# Patient Record
Sex: MALE | Race: WHITE | NOT HISPANIC OR LATINO | Employment: FULL TIME | URBAN - METROPOLITAN AREA
[De-identification: names, ages, dates, MRNs, and addresses within clinical notes are randomized per-mention and may not be internally consistent; named-entity substitution may affect disease eponyms.]

---

## 2022-02-07 NOTE — PROGRESS NOTES
Subjective:      Chief Complaint: Establishing     HPI   Mr. Colin Mixon is a 36 man presenting as a new patient to establish primary care.  Does have a rolled orthopedic history (bilateral ankle surgery, chronic relapsing/remitting low back pain, etc.), but none are currently in exacerbation.    Family, social, surgical Hx reviewed     Health Maintenance:  Prostate CA screening: no indication for early initiation.   Colorectal CA screening: no indication for early initiation.   Cholesterol: Due for baseline  Vaccines:  For annual flu (prioritized) and COVID booster (will have done through [employer])  STD screening:  Deferred  Diet and Exercise: Discussed current recommendations     Past Medical History:   Diagnosis Date    Basal cell carcinoma     History of ankle surgery      No past surgical history on file.  Family History   Problem Relation Age of Onset    Skin cancer Mother      Social History     Socioeconomic History    Marital status:    Occupational History    Occupation: [employer]    Tobacco Use    Smoking status: Never Smoker    Smokeless tobacco: Never Used   Substance and Sexual Activity    Alcohol use: Yes     Comment: Occ     Drug use: Never    Sexual activity: Yes     Partners: Female     Review of patient's allergies indicates:  No Known Allergies  Kaden Mixon had no medications administered during this visit.    Review of Systems   Constitutional: Negative for appetite change, chills and fever.   HENT: Negative.    Respiratory: Negative for cough, chest tightness and shortness of breath.    Cardiovascular: Negative for chest pain, palpitations and leg swelling.   Gastrointestinal: Negative for abdominal distention, abdominal pain, blood in stool, constipation, diarrhea, nausea and vomiting.   Endocrine: Negative.    Genitourinary: Negative for difficulty urinating, dysuria, frequency and hematuria.   Musculoskeletal: Negative.    Integumentary:  Negative.    Neurological: Negative.    Psychiatric/Behavioral: Negative.          Objective:      Vitals:    02/08/22 0742   BP: 118/82   Pulse: 78   Resp: 20   Temp: 97.9 °F (36.6 °C)      Physical Exam  Vitals reviewed.   Constitutional:       General: He is not in acute distress.     Appearance: Normal appearance.   HENT:      Head: Normocephalic and atraumatic.      Comments: Facial features are symmetric      Nose: Nose normal. No congestion or rhinorrhea.      Mouth/Throat:      Mouth: Mucous membranes are moist.      Pharynx: Oropharynx is clear. No oropharyngeal exudate or posterior oropharyngeal erythema.   Eyes:      General: No scleral icterus.     Extraocular Movements: Extraocular movements intact.      Conjunctiva/sclera: Conjunctivae normal.   Cardiovascular:      Rate and Rhythm: Normal rate and regular rhythm.      Pulses: Normal pulses.      Heart sounds: Normal heart sounds.   Pulmonary:      Effort: Pulmonary effort is normal. No respiratory distress.      Breath sounds: Normal breath sounds.   Musculoskeletal:         General: No deformity or signs of injury. Normal range of motion.      Cervical back: Normal range of motion.      Comments: Gait normal    Skin:     General: Skin is warm and dry.      Findings: No rash.   Neurological:      General: No focal deficit present.      Mental Status: He is alert and oriented to person, place, and time. Mental status is at baseline.   Psychiatric:         Mood and Affect: Mood normal.         Behavior: Behavior normal.         Thought Content: Thought content normal.       No current outpatient medications on file prior to visit.     No current facility-administered medications on file prior to visit.         Assessment:       1. Establishing care with new doctor, encounter for    2. Physical exam, annual        Plan:       Establishing care with new doctor, encounter for  Physical exam, annual  -     CBC Auto Differential; Future; Expected date: 02/08/2022  -      Comprehensive Metabolic Panel; Future; Expected date: 02/08/2022  -     Hemoglobin A1C; Future; Expected date: 02/08/2022  -     Hepatitis C Antibody; Future; Expected date: 02/08/2022  -     HIV 1/2 Ag/Ab (4th Gen); Future; Expected date: 02/08/2022  -     Lipid Panel; Future; Expected date: 02/08/2022  -     T4; Future; Expected date: 02/08/2022  -     TSH; Future; Expected date: 02/08/2022   - baseline/annual screening labs ordered   - vaccinations brought up-to-date with in office flu shot; patient have COVID booster administered via [employer]; health maintenance otherwise up-to-date

## 2022-02-08 ENCOUNTER — LAB VISIT (OUTPATIENT)
Dept: LAB | Facility: HOSPITAL | Age: 37
End: 2022-02-08
Attending: STUDENT IN AN ORGANIZED HEALTH CARE EDUCATION/TRAINING PROGRAM
Payer: COMMERCIAL

## 2022-02-08 ENCOUNTER — OFFICE VISIT (OUTPATIENT)
Dept: INTERNAL MEDICINE | Facility: CLINIC | Age: 37
End: 2022-02-08
Payer: COMMERCIAL

## 2022-02-08 VITALS
TEMPERATURE: 98 F | RESPIRATION RATE: 20 BRPM | WEIGHT: 189.38 LBS | SYSTOLIC BLOOD PRESSURE: 118 MMHG | DIASTOLIC BLOOD PRESSURE: 82 MMHG | OXYGEN SATURATION: 98 % | HEART RATE: 78 BPM | HEIGHT: 68 IN | BODY MASS INDEX: 28.7 KG/M2

## 2022-02-08 DIAGNOSIS — Z76.89 ESTABLISHING CARE WITH NEW DOCTOR, ENCOUNTER FOR: Primary | ICD-10-CM

## 2022-02-08 DIAGNOSIS — Z00.00 PHYSICAL EXAM, ANNUAL: ICD-10-CM

## 2022-02-08 LAB
ALBUMIN SERPL BCP-MCNC: 4.5 G/DL (ref 3.5–5.2)
ALP SERPL-CCNC: 40 U/L (ref 55–135)
ALT SERPL W/O P-5'-P-CCNC: 16 U/L (ref 10–44)
ANION GAP SERPL CALC-SCNC: 9 MMOL/L (ref 8–16)
AST SERPL-CCNC: 21 U/L (ref 10–40)
BASOPHILS # BLD AUTO: 0.04 K/UL (ref 0–0.2)
BASOPHILS NFR BLD: 1 % (ref 0–1.9)
BILIRUB SERPL-MCNC: 0.9 MG/DL (ref 0.1–1)
BUN SERPL-MCNC: 13 MG/DL (ref 6–20)
CALCIUM SERPL-MCNC: 10.2 MG/DL (ref 8.7–10.5)
CHLORIDE SERPL-SCNC: 103 MMOL/L (ref 95–110)
CHOLEST SERPL-MCNC: 233 MG/DL (ref 120–199)
CHOLEST/HDLC SERPL: 4 {RATIO} (ref 2–5)
CO2 SERPL-SCNC: 23 MMOL/L (ref 23–29)
CREAT SERPL-MCNC: 0.8 MG/DL (ref 0.5–1.4)
DIFFERENTIAL METHOD: NORMAL
EOSINOPHIL # BLD AUTO: 0.1 K/UL (ref 0–0.5)
EOSINOPHIL NFR BLD: 3.2 % (ref 0–8)
ERYTHROCYTE [DISTWIDTH] IN BLOOD BY AUTOMATED COUNT: 12.2 % (ref 11.5–14.5)
EST. GFR  (AFRICAN AMERICAN): >60 ML/MIN/1.73 M^2
EST. GFR  (NON AFRICAN AMERICAN): >60 ML/MIN/1.73 M^2
ESTIMATED AVG GLUCOSE: 100 MG/DL (ref 68–131)
GLUCOSE SERPL-MCNC: 93 MG/DL (ref 70–110)
HBA1C MFR BLD: 5.1 % (ref 4–5.6)
HCT VFR BLD AUTO: 42.9 % (ref 40–54)
HCV AB SERPL QL IA: NEGATIVE
HDLC SERPL-MCNC: 58 MG/DL (ref 40–75)
HDLC SERPL: 24.9 % (ref 20–50)
HGB BLD-MCNC: 14.3 G/DL (ref 14–18)
HIV 1+2 AB+HIV1 P24 AG SERPL QL IA: NEGATIVE
IMM GRANULOCYTES # BLD AUTO: 0.01 K/UL (ref 0–0.04)
IMM GRANULOCYTES NFR BLD AUTO: 0.2 % (ref 0–0.5)
LDLC SERPL CALC-MCNC: 153.6 MG/DL (ref 63–159)
LYMPHOCYTES # BLD AUTO: 1.7 K/UL (ref 1–4.8)
LYMPHOCYTES NFR BLD: 41.4 % (ref 18–48)
MCH RBC QN AUTO: 30 PG (ref 27–31)
MCHC RBC AUTO-ENTMCNC: 33.3 G/DL (ref 32–36)
MCV RBC AUTO: 90 FL (ref 82–98)
MONOCYTES # BLD AUTO: 0.4 K/UL (ref 0.3–1)
MONOCYTES NFR BLD: 9.7 % (ref 4–15)
NEUTROPHILS # BLD AUTO: 1.8 K/UL (ref 1.8–7.7)
NEUTROPHILS NFR BLD: 44.5 % (ref 38–73)
NONHDLC SERPL-MCNC: 175 MG/DL
NRBC BLD-RTO: 0 /100 WBC
PLATELET # BLD AUTO: 270 K/UL (ref 150–450)
PMV BLD AUTO: 11.8 FL (ref 9.2–12.9)
POTASSIUM SERPL-SCNC: 4.1 MMOL/L (ref 3.5–5.1)
PROT SERPL-MCNC: 7.6 G/DL (ref 6–8.4)
RBC # BLD AUTO: 4.76 M/UL (ref 4.6–6.2)
SODIUM SERPL-SCNC: 135 MMOL/L (ref 136–145)
T4 SERPL-MCNC: 8.2 UG/DL (ref 4.5–11.5)
TRIGL SERPL-MCNC: 107 MG/DL (ref 30–150)
TSH SERPL DL<=0.005 MIU/L-ACNC: 2.17 UIU/ML (ref 0.4–4)
WBC # BLD AUTO: 4.03 K/UL (ref 3.9–12.7)

## 2022-02-08 PROCEDURE — 80061 LIPID PANEL: CPT | Performed by: STUDENT IN AN ORGANIZED HEALTH CARE EDUCATION/TRAINING PROGRAM

## 2022-02-08 PROCEDURE — 84443 ASSAY THYROID STIM HORMONE: CPT | Performed by: STUDENT IN AN ORGANIZED HEALTH CARE EDUCATION/TRAINING PROGRAM

## 2022-02-08 PROCEDURE — 3079F DIAST BP 80-89 MM HG: CPT | Mod: CPTII,S$GLB,, | Performed by: STUDENT IN AN ORGANIZED HEALTH CARE EDUCATION/TRAINING PROGRAM

## 2022-02-08 PROCEDURE — 99999 PR PBB SHADOW E&M-NEW PATIENT-LVL III: ICD-10-PCS | Mod: PBBFAC,,, | Performed by: STUDENT IN AN ORGANIZED HEALTH CARE EDUCATION/TRAINING PROGRAM

## 2022-02-08 PROCEDURE — 90471 IMMUNIZATION ADMIN: CPT | Mod: S$GLB,,, | Performed by: STUDENT IN AN ORGANIZED HEALTH CARE EDUCATION/TRAINING PROGRAM

## 2022-02-08 PROCEDURE — 3079F PR MOST RECENT DIASTOLIC BLOOD PRESSURE 80-89 MM HG: ICD-10-PCS | Mod: CPTII,S$GLB,, | Performed by: STUDENT IN AN ORGANIZED HEALTH CARE EDUCATION/TRAINING PROGRAM

## 2022-02-08 PROCEDURE — 83036 HEMOGLOBIN GLYCOSYLATED A1C: CPT | Performed by: STUDENT IN AN ORGANIZED HEALTH CARE EDUCATION/TRAINING PROGRAM

## 2022-02-08 PROCEDURE — 85025 COMPLETE CBC W/AUTO DIFF WBC: CPT | Performed by: STUDENT IN AN ORGANIZED HEALTH CARE EDUCATION/TRAINING PROGRAM

## 2022-02-08 PROCEDURE — 99385 PREV VISIT NEW AGE 18-39: CPT | Mod: 25,S$GLB,, | Performed by: STUDENT IN AN ORGANIZED HEALTH CARE EDUCATION/TRAINING PROGRAM

## 2022-02-08 PROCEDURE — 3008F PR BODY MASS INDEX (BMI) DOCUMENTED: ICD-10-PCS | Mod: CPTII,S$GLB,, | Performed by: STUDENT IN AN ORGANIZED HEALTH CARE EDUCATION/TRAINING PROGRAM

## 2022-02-08 PROCEDURE — 3074F PR MOST RECENT SYSTOLIC BLOOD PRESSURE < 130 MM HG: ICD-10-PCS | Mod: CPTII,S$GLB,, | Performed by: STUDENT IN AN ORGANIZED HEALTH CARE EDUCATION/TRAINING PROGRAM

## 2022-02-08 PROCEDURE — 3074F SYST BP LT 130 MM HG: CPT | Mod: CPTII,S$GLB,, | Performed by: STUDENT IN AN ORGANIZED HEALTH CARE EDUCATION/TRAINING PROGRAM

## 2022-02-08 PROCEDURE — 90471 FLU VACCINE (QUAD) GREATER THAN OR EQUAL TO 3YO PRESERVATIVE FREE IM: ICD-10-PCS | Mod: S$GLB,,, | Performed by: STUDENT IN AN ORGANIZED HEALTH CARE EDUCATION/TRAINING PROGRAM

## 2022-02-08 PROCEDURE — 80053 COMPREHEN METABOLIC PANEL: CPT | Performed by: STUDENT IN AN ORGANIZED HEALTH CARE EDUCATION/TRAINING PROGRAM

## 2022-02-08 PROCEDURE — 1159F MED LIST DOCD IN RCRD: CPT | Mod: CPTII,S$GLB,, | Performed by: STUDENT IN AN ORGANIZED HEALTH CARE EDUCATION/TRAINING PROGRAM

## 2022-02-08 PROCEDURE — 87389 HIV-1 AG W/HIV-1&-2 AB AG IA: CPT | Performed by: STUDENT IN AN ORGANIZED HEALTH CARE EDUCATION/TRAINING PROGRAM

## 2022-02-08 PROCEDURE — 90686 IIV4 VACC NO PRSV 0.5 ML IM: CPT | Mod: S$GLB,,, | Performed by: STUDENT IN AN ORGANIZED HEALTH CARE EDUCATION/TRAINING PROGRAM

## 2022-02-08 PROCEDURE — 3008F BODY MASS INDEX DOCD: CPT | Mod: CPTII,S$GLB,, | Performed by: STUDENT IN AN ORGANIZED HEALTH CARE EDUCATION/TRAINING PROGRAM

## 2022-02-08 PROCEDURE — 99385 PR PREVENTIVE VISIT,NEW,18-39: ICD-10-PCS | Mod: 25,S$GLB,, | Performed by: STUDENT IN AN ORGANIZED HEALTH CARE EDUCATION/TRAINING PROGRAM

## 2022-02-08 PROCEDURE — 86803 HEPATITIS C AB TEST: CPT | Performed by: STUDENT IN AN ORGANIZED HEALTH CARE EDUCATION/TRAINING PROGRAM

## 2022-02-08 PROCEDURE — 84436 ASSAY OF TOTAL THYROXINE: CPT | Performed by: STUDENT IN AN ORGANIZED HEALTH CARE EDUCATION/TRAINING PROGRAM

## 2022-02-08 PROCEDURE — 90686 FLU VACCINE (QUAD) GREATER THAN OR EQUAL TO 3YO PRESERVATIVE FREE IM: ICD-10-PCS | Mod: S$GLB,,, | Performed by: STUDENT IN AN ORGANIZED HEALTH CARE EDUCATION/TRAINING PROGRAM

## 2022-02-08 PROCEDURE — 1159F PR MEDICATION LIST DOCUMENTED IN MEDICAL RECORD: ICD-10-PCS | Mod: CPTII,S$GLB,, | Performed by: STUDENT IN AN ORGANIZED HEALTH CARE EDUCATION/TRAINING PROGRAM

## 2022-02-08 PROCEDURE — 36415 COLL VENOUS BLD VENIPUNCTURE: CPT | Mod: PO | Performed by: STUDENT IN AN ORGANIZED HEALTH CARE EDUCATION/TRAINING PROGRAM

## 2022-02-08 PROCEDURE — 99999 PR PBB SHADOW E&M-NEW PATIENT-LVL III: CPT | Mod: PBBFAC,,, | Performed by: STUDENT IN AN ORGANIZED HEALTH CARE EDUCATION/TRAINING PROGRAM

## 2023-03-06 ENCOUNTER — PATIENT OUTREACH (OUTPATIENT)
Dept: ADMINISTRATIVE | Facility: HOSPITAL | Age: 38
End: 2023-03-06
Payer: COMMERCIAL

## 2023-10-16 ENCOUNTER — TELEPHONE (OUTPATIENT)
Dept: DERMATOLOGY | Facility: CLINIC | Age: 38
End: 2023-10-16
Payer: COMMERCIAL

## 2023-10-16 ENCOUNTER — PATIENT MESSAGE (OUTPATIENT)
Dept: DERMATOLOGY | Facility: CLINIC | Age: 38
End: 2023-10-16
Payer: COMMERCIAL

## 2023-10-17 ENCOUNTER — TELEPHONE (OUTPATIENT)
Dept: DERMATOLOGY | Facility: CLINIC | Age: 38
End: 2023-10-17
Payer: COMMERCIAL

## 2023-10-18 ENCOUNTER — TELEPHONE (OUTPATIENT)
Dept: DERMATOLOGY | Facility: CLINIC | Age: 38
End: 2023-10-18
Payer: COMMERCIAL

## 2023-10-18 ENCOUNTER — PATIENT MESSAGE (OUTPATIENT)
Dept: DERMATOLOGY | Facility: CLINIC | Age: 38
End: 2023-10-18
Payer: COMMERCIAL

## 2023-10-18 NOTE — TELEPHONE ENCOUNTER
Spoke w/pt, same day Mohs for BCC L nose scheduled 11/29/23 @ 0730/ Phone screening completed, pt verb understanding of procedure, post op information, date/time and location of procedure. Mohs packet mailed and sent via portal.                 ----- Message from Indra Dumont sent at 10/18/2023  9:11 AM CDT -----  Regarding: Mohs for BCC L nose appt  Contact: [phone]  Patient is calling to schedule appointment due to Mohs for BCC L nose.  Please contact patient to further discuss.

## 2023-11-16 ENCOUNTER — TELEPHONE (OUTPATIENT)
Dept: SLEEP MEDICINE | Facility: CLINIC | Age: 38
End: 2023-11-16
Payer: COMMERCIAL

## 2023-11-16 NOTE — TELEPHONE ENCOUNTER
[facility]ons[facility] Clinical Support Staff  Caller: [phone] (Today, 10:09 AM)  Patient wife is calling to speak with in clinic for an appointment she has some question she would like to ask before scheduling. Please contact pt wife    Patient was contacted and schedule for a self referral, has currently [employer] insurance, patient wife stated patient is snoring, no gasping for air, trouble falling and staying asleep.

## 2023-11-29 ENCOUNTER — PROCEDURE VISIT (OUTPATIENT)
Dept: DERMATOLOGY | Facility: CLINIC | Age: 38
End: 2023-11-29
Payer: COMMERCIAL

## 2023-11-29 VITALS
WEIGHT: 189 LBS | HEIGHT: 68 IN | SYSTOLIC BLOOD PRESSURE: 141 MMHG | DIASTOLIC BLOOD PRESSURE: 94 MMHG | HEART RATE: 69 BPM | BODY MASS INDEX: 28.64 KG/M2

## 2023-11-29 DIAGNOSIS — C44.311 BASAL CELL CARCINOMA OF LEFT SIDE OF NOSE: Primary | ICD-10-CM

## 2023-11-29 PROCEDURE — 99499 UNLISTED E&M SERVICE: CPT | Mod: S$GLB,,, | Performed by: DERMATOLOGY

## 2023-11-29 PROCEDURE — 17312: ICD-10-PCS | Mod: S$GLB,,, | Performed by: DERMATOLOGY

## 2023-11-29 PROCEDURE — 17312 MOHS ADDL STAGE: CPT | Mod: S$GLB,,, | Performed by: DERMATOLOGY

## 2023-11-29 PROCEDURE — 17311 MOHS 1 STAGE H/N/HF/G: CPT | Mod: S$GLB,,, | Performed by: DERMATOLOGY

## 2023-11-29 PROCEDURE — 13151 PR RECMPL WND LID,NOS,EAR 1.1-2.5 CM: ICD-10-PCS | Mod: 51,S$GLB,, | Performed by: DERMATOLOGY

## 2023-11-29 PROCEDURE — 99499 NO LOS: ICD-10-PCS | Mod: S$GLB,,, | Performed by: DERMATOLOGY

## 2023-11-29 PROCEDURE — 17311: ICD-10-PCS | Mod: S$GLB,,, | Performed by: DERMATOLOGY

## 2023-11-29 PROCEDURE — 13151 CMPLX RPR E/N/E/L 1.1-2.5 CM: CPT | Mod: 51,S$GLB,, | Performed by: DERMATOLOGY

## 2023-11-29 RX ORDER — HYDROCODONE BITARTRATE AND ACETAMINOPHEN 5; 325 MG/1; MG/1
1 TABLET ORAL EVERY 6 HOURS PRN
Qty: 10 TABLET | Refills: 0 | Status: SHIPPED | OUTPATIENT
Start: 2023-11-29 | End: 2024-02-27

## 2023-11-29 RX ORDER — CEPHALEXIN 500 MG/1
500 CAPSULE ORAL 3 TIMES DAILY
Qty: 21 CAPSULE | Refills: 0 | Status: SHIPPED | OUTPATIENT
Start: 2023-11-29 | End: 2023-12-06

## 2023-11-29 NOTE — PROGRESS NOTES
New patient with a 1 month h/o growth on the L nose. (+) scabbing. Denies bleeding, itching. Biopsy c/w BCC. No prior treatment. (+) h/o BCC R nose.    Physical Exam   HENT:   Nose:           L mid nasal sidewall with a 3 x 3 mm pink bx site located 3 cm inferiorly from the left medial canthus and 3.5 cm superiorly from the left alar base.    Biopsy proven nodular basal cell carcinoma- L nose, path# [id number].  Diagnosis, photograph, and pathology report were reviewed with patient.  Discussed risks, benefits, and alternatives of Mohs surgery.  Discussed repair options including complex closure, skin flap, skin graft, and second intention healing.  Patient agreed to proceed with Mohs surgery today.      PROCEDURE: Mohs' Micrographic Surgery    INDICATION: Location in mask areas of face including central face, nose, eyelids, eyebrows, lips, chin, preauricular, temple, and ear. Biopsy-proven skin cancer of cosmetically and functionally important areas, including head, neck, genital, hand, foot, or areas known for having difficulty in healing, such as the lower anterior legs. Tumor with ill-defined borders. In the very young patient, < 40 years of age.    REFERRING PROVIDER: Jessica Coller [facility], M.D.    CASE NUMBER:     ANESTHETIC: 2.5 cc 0.5% Lidocaine with Epi 1:200,000 mixed 1:1 with 0.5% Bupivacaine    SURGICAL PREP: Hibiclens    SURGEON: Brody Luis MD    ASSISTANTS: Fanny Angelo PA-C, Shwetha Newman MA, and Ashanti Crawford, Surg Tech    PREOPERATIVE DIAGNOSIS: basal cell carcinoma- nodular    POSTOPERATIVE DIAGNOSIS: basal cell carcinoma- nodular, superficial    PATHOLOGIC DIAGNOSIS: basal cell carcinoma- nodular, superficial    HISTOLOGY OF SPECIMENS IN FIRST STAGE:   Debulking tumor confirms nodular basal cell carcinoma.  Tumor Type: Tumor seen. Superficial basal cell carcinoma: Foci of basaloid cells with peripheral palisading and focal retraction artifact arising along the dermoepidermal junction and  extending into the papillary dermis.  Nodular basal cell carcinoma: Nodular tumor in dermis composed of basaloid cells exhibiting peripheral palisading and retraction artifact.   Depth of Invasion: epidermis and dermis  Perineural Invasion: No    HISTOLOGY OF SPECIMENS IN SUBSEQUENT STAGES:  Tumor Type:  No tumor seen.    STAGES OF MOHS' SURGERY PERFORMED: 2    TUMOR-FREE PLANE ACHIEVED: Yes    HEMOSTASIS: electrocoagulation     SPECIMENS: 4 (2 in stage A and 2 in stage B)    LOCATION: left nose (mid sidewall). Location verified with patient's photograph. Patient also verified location with hand held mirror.     INITIAL LESION SIZE: 0.3 x 0.3 cm    FINAL DEFECT SIZE: 0.8 x 1.0 cm    WOUND REPAIR/DISPOSITION: The patient tolerated Mohs' Micrographic Surgery for a basal cell carcinoma very well. When the tumor was completely removed, a repair of the surgical defect was undertaken.    PROCEDURE: Complex Linear Repair    INDICATION: Status post Mohs' Micrographic Surgery for basal cell carcinoma.    CASE NUMBER:     SURGEON: Brody Luis MD    ASSISTANTS: Fanny Angelo PA-C and Ashanti Crawford Surg Marita    ANESTHETIC: 0.5 cc 1% Lidocaine with Epinephrine 1:100,000    SURGICAL PREP: Hibiclens, prepped by Ashanti Crawford Surg Marita    LOCATION: left nose (mid sidewall)    DEFECT SIZE: 0.8 x 1.0 cm    WOUND REPAIR/DISPOSITION:  After the patient's carcinoma had been completely removed with Mohs' Micrographic Surgery, a repair of the surgical defect was undertaken. The patient was returned to the operating suite where the area of left mid sidewall was prepped, draped, and anesthetized in the usual sterile fashion. The wound was widely undermined in all directions. The wound was undermined to a distance at least the maximum width of the defect as measured perpendicular to the closure line along at least one entire edge of the defect, in this case 1.5 cm. Then, electrocoagulation was used to obtain meticulous hemostasis. 5-0  "Vicryl buried vertical mattress sutures were placed into the subcutaneous and dermal plane to close the wound and antonina the cutaneous wound edge. Bilateral dog ears were identified and were removed by a standard Burow's triangle technique. The cutaneous wound edges were closed using interrupted 5-0 Prolene suture.    The patient tolerated the procedure well.    The area was cleaned and dressed appropriately and the patient was given wound care instructions, as well as appointment for follow-up evaluation and suture removal in 7 days. Patient was placed on Norco 5-325 mg prn postop pain and Keflex 500 mg TID x 7 days.    LENGTH OF REPAIR: 2.3 cm    Vitals:    11/29/23 0659 11/29/23 1004   BP: 132/89 (!) 141/94   BP Location: Left arm Right arm   Patient Position: Sitting Sitting   BP Method: Large (Automatic) Medium (Automatic)   Pulse: 83 69   Weight: 85.7 kg (189 lb)    Height: 5' 8" (1.727 m)          "

## 2023-12-06 ENCOUNTER — OFFICE VISIT (OUTPATIENT)
Dept: DERMATOLOGY | Facility: CLINIC | Age: 38
End: 2023-12-06
Payer: COMMERCIAL

## 2023-12-06 DIAGNOSIS — Z09 POSTOP CHECK: Primary | ICD-10-CM

## 2023-12-06 PROCEDURE — 99999 PR PBB SHADOW E&M-EST. PATIENT-LVL I: CPT | Mod: PBBFAC,,, | Performed by: DERMATOLOGY

## 2023-12-06 PROCEDURE — 99999 PR PBB SHADOW E&M-EST. PATIENT-LVL I: ICD-10-PCS | Mod: PBBFAC,,, | Performed by: DERMATOLOGY

## 2023-12-06 PROCEDURE — 99024 POSTOP FOLLOW-UP VISIT: CPT | Mod: S$GLB,,, | Performed by: DERMATOLOGY

## 2023-12-06 PROCEDURE — 99024 PR POST-OP FOLLOW-UP VISIT: ICD-10-PCS | Mod: S$GLB,,, | Performed by: DERMATOLOGY

## 2023-12-06 NOTE — PROGRESS NOTES
38 y.o. male patient is here for suture removal following Mohs' surgery.    Patient reports no problems.    WOUND PE:  The L nose sutures intact. Wound healing well. Good skin edges. No signs or symptoms of infection.    IMPRESSION:  Healing operative site from Mohs' surgery, BCC L nose s/p Mohs with CLC, postop day #7.    PLAN:  Sutures removed today by  Miracle Moser RN . Steri-strips applied.  Continue wound care.  Keep moist with Aquaphor.  May start massaging in 1 month  Offered 1 month recheck - pt to call if needed.     RTC:  In 3-6 months with Jessica Coller [facility], M.D. for skin check or sooner if new concern arises.

## 2024-02-26 ENCOUNTER — PATIENT MESSAGE (OUTPATIENT)
Dept: SLEEP MEDICINE | Facility: CLINIC | Age: 39
End: 2024-02-26
Payer: COMMERCIAL

## 2024-02-27 ENCOUNTER — OFFICE VISIT (OUTPATIENT)
Dept: SLEEP MEDICINE | Facility: CLINIC | Age: 39
End: 2024-02-27
Payer: COMMERCIAL

## 2024-02-27 VITALS — BODY MASS INDEX: 28.04 KG/M2 | HEIGHT: 68 IN | WEIGHT: 185 LBS

## 2024-02-27 DIAGNOSIS — G47.30 SLEEP APNEA, UNSPECIFIED TYPE: Primary | ICD-10-CM

## 2024-02-27 PROCEDURE — 3008F BODY MASS INDEX DOCD: CPT | Mod: CPTII,S$GLB,, | Performed by: NURSE PRACTITIONER

## 2024-02-27 PROCEDURE — 99203 OFFICE O/P NEW LOW 30 MIN: CPT | Mod: S$GLB,,, | Performed by: NURSE PRACTITIONER

## 2024-02-27 PROCEDURE — 99999 PR PBB SHADOW E&M-EST. PATIENT-LVL II: CPT | Mod: PBBFAC,,, | Performed by: NURSE PRACTITIONER

## 2024-02-27 PROCEDURE — 1159F MED LIST DOCD IN RCRD: CPT | Mod: CPTII,S$GLB,, | Performed by: NURSE PRACTITIONER

## 2024-02-27 NOTE — PROGRESS NOTES
"Self-referred    CHIEF COMPLAINT: Sleep evaluation    HISTORY OF PRESENT ILLNESS:He has never had a sleep study. Chronic light sleeper. Difficulty falling and staying asleep, worsened since back from [facility], did 2 back to back  tours . Mild intermittent snoring and denies witnessed apneic pauses. Frequent disrupted sleep 4-5x, maybe once to use bathroom. Sometimes he can't fall back asleep. He gets in bed 8-9p to try to get more rest/fall asleep but often not until 2-4hr later still awake.After several nights of poor sleep he'll then sleep very hard, and wife concerned because he's hard to arouse. He is very tired during daytime.     Denies symptoms of restless legs or am headaches    On todays [place] Sleepiness Scale the patient scores a 1/24.       FAMILY HISTORY: No known sleep disorders.   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 Warrants investigation for untreated sleep apnea.     PLAN:   1. Home Sleep Study, discussed plan of care    2. Discussed etiology of SE and potential treatment options, which could include weight loss , continuous positive airway pressure (CPAP-definitive), mandibular advancement splint by dentist, or referral for surgical consideration.   Trial mag oxide or glycinate 400mg or melatonin 10mg   "

## 2024-03-07 ENCOUNTER — OFFICE VISIT (OUTPATIENT)
Dept: INTERNAL MEDICINE | Facility: CLINIC | Age: 39
End: 2024-03-07
Payer: COMMERCIAL

## 2024-03-07 VITALS
HEART RATE: 97 BPM | DIASTOLIC BLOOD PRESSURE: 70 MMHG | RESPIRATION RATE: 16 BRPM | OXYGEN SATURATION: 99 % | SYSTOLIC BLOOD PRESSURE: 130 MMHG | HEIGHT: 68 IN | TEMPERATURE: 98 F | WEIGHT: 186.75 LBS | BODY MASS INDEX: 28.3 KG/M2

## 2024-03-07 DIAGNOSIS — Z00.00 PHYSICAL EXAM, ANNUAL: Primary | ICD-10-CM

## 2024-03-07 PROCEDURE — 1159F MED LIST DOCD IN RCRD: CPT | Mod: CPTII,S$GLB,, | Performed by: STUDENT IN AN ORGANIZED HEALTH CARE EDUCATION/TRAINING PROGRAM

## 2024-03-07 PROCEDURE — 99395 PREV VISIT EST AGE 18-39: CPT | Mod: S$GLB,,, | Performed by: STUDENT IN AN ORGANIZED HEALTH CARE EDUCATION/TRAINING PROGRAM

## 2024-03-07 PROCEDURE — 3008F BODY MASS INDEX DOCD: CPT | Mod: CPTII,S$GLB,, | Performed by: STUDENT IN AN ORGANIZED HEALTH CARE EDUCATION/TRAINING PROGRAM

## 2024-03-07 PROCEDURE — 3078F DIAST BP <80 MM HG: CPT | Mod: CPTII,S$GLB,, | Performed by: STUDENT IN AN ORGANIZED HEALTH CARE EDUCATION/TRAINING PROGRAM

## 2024-03-07 PROCEDURE — 3075F SYST BP GE 130 - 139MM HG: CPT | Mod: CPTII,S$GLB,, | Performed by: STUDENT IN AN ORGANIZED HEALTH CARE EDUCATION/TRAINING PROGRAM

## 2024-03-07 PROCEDURE — 99999 PR PBB SHADOW E&M-EST. PATIENT-LVL III: CPT | Mod: PBBFAC,,, | Performed by: STUDENT IN AN ORGANIZED HEALTH CARE EDUCATION/TRAINING PROGRAM

## 2024-03-07 NOTE — PROGRESS NOTES
Subjective:      Chief Complaint: Annual Exam    HPI  Mr. Colin Mixon is a 38 man with significant orthopedic history (bilateral ankle surgery, chronic relapsing/remitting low back pain, etc.) presenting  for annual physical:      Family, social, surgical Hx reviewed     Health Maintenance:  Due for annual screening labs      Past Medical History:   Diagnosis Date    Basal cell carcinoma     History of ankle surgery      No past surgical history on file.  Family History   Problem Relation Age of Onset    Skin cancer Mother      Social History     Socioeconomic History    Marital status:    Occupational History    Occupation: [employer]    Tobacco Use    Smoking status: Never    Smokeless tobacco: Never   Substance and Sexual Activity    Alcohol use: Yes     Comment: Occ     Drug use: Never    Sexual activity: Yes     Partners: Female     Social Determinants of Health     Financial Resource Strain: Low Risk  (12/6/2023)    Overall Financial Resource Strain (CARDIA)     Difficulty of Paying Living Expenses: Not hard at all   Food Insecurity: No Food Insecurity (12/6/2023)    Hunger Vital Sign     Worried About Running Out of Food in the Last Year: Never true     Ran Out of Food in the Last Year: Never true   Transportation Needs: No Transportation Needs (12/6/2023)    PRAPARE - Transportation     Lack of Transportation (Medical): No     Lack of Transportation (Non-Medical): No   Physical Activity: Sufficiently Active (12/6/2023)    Exercise Vital Sign     Days of Exercise per Week: 5 days     Minutes of Exercise per Session: 30 min   Stress: Stress Concern Present (12/6/2023)    [employer] [place] [employer] - Occupational Stress Questionnaire     Feeling of Stress : To some extent   Social Connections: Unknown (12/6/2023)    Social Connection and Isolation Panel [NHANES]     Frequency of Communication with Friends and Family: Three times a week     Frequency of Social Gatherings with  Friends and Family: Once a week     Active Member of Clubs or Organizations: Yes     Attends Club or Organization Meetings: 1 to 4 times per year     Marital Status:    Housing Stability: Low Risk  (12/6/2023)    Housing Stability Vital Sign     Unable to Pay for Housing in the Last Year: No     Number of Places Lived in the Last Year: 1     Unstable Housing in the Last Year: No     Review of patient's allergies indicates:  No Known Allergies  Kaden Mixon had no medications administered during this visit.    Review of Systems   Constitutional:  Negative for activity change and unexpected weight change.   HENT:  Negative for hearing loss, rhinorrhea and trouble swallowing.    Eyes:  Negative for discharge and visual disturbance.   Respiratory:  Negative for chest tightness and wheezing.    Cardiovascular:  Negative for chest pain and palpitations.   Gastrointestinal:  Negative for blood in stool, constipation, diarrhea and vomiting.   Endocrine: Negative for polydipsia and polyuria.   Genitourinary:  Negative for difficulty urinating, hematuria and urgency.   Musculoskeletal:  Negative for arthralgias, joint swelling and neck pain.   Neurological:  Negative for weakness and headaches.   Psychiatric/Behavioral:  Negative for confusion and dysphoric mood.          Objective:      Vitals:    03/07/24 1516   BP: 130/70   Pulse: 97   Resp: 16   Temp: 98 °F (36.7 °C)      Physical Exam  Vitals reviewed.   Constitutional:       General: He is not in acute distress.     Appearance: Normal appearance.   HENT:      Head: Normocephalic and atraumatic.      Comments: Facial features are symmetric      Nose: Nose normal. No congestion or rhinorrhea.      Mouth/Throat:      Mouth: Mucous membranes are moist.      Pharynx: Oropharynx is clear. No oropharyngeal exudate or posterior oropharyngeal erythema.   Eyes:      General: No scleral icterus.     Extraocular Movements: Extraocular movements intact.       Conjunctiva/sclera: Conjunctivae normal.   Cardiovascular:      Rate and Rhythm: Normal rate and regular rhythm.      Pulses: Normal pulses.      Heart sounds: Normal heart sounds.   Pulmonary:      Effort: Pulmonary effort is normal. No respiratory distress.      Breath sounds: Normal breath sounds.   Musculoskeletal:         General: No deformity or signs of injury. Normal range of motion.      Cervical back: Normal range of motion.      Comments: Gait normal    Skin:     General: Skin is warm and dry.      Findings: No rash.   Neurological:      General: No focal deficit present.      Mental Status: He is alert and oriented to person, place, and time. Mental status is at baseline.   Psychiatric:         Mood and Affect: Mood normal.         Behavior: Behavior normal.         Thought Content: Thought content normal.       No current outpatient medications on file prior to visit.     No current facility-administered medications on file prior to visit.         Assessment:       1. Physical exam, annual        Plan:       Physical exam, annual  -     CBC Auto Differential; Future; Expected date: 03/07/2024  -     Comprehensive Metabolic Panel; Future; Expected date: 03/07/2024  -     Hemoglobin A1C; Future; Expected date: 03/07/2024  -     Lipid Panel; Future; Expected date: 03/07/2024  -     T4; Future; Expected date: 03/07/2024  -     TSH; Future; Expected date: 03/07/2024  -     Vitamin D; Future; Expected date: 03/07/2024   - Annual screening labs ordered    Return to clinic in one year for annual exam or sooner if dictated by labs or illness.

## 2024-03-13 ENCOUNTER — TELEPHONE (OUTPATIENT)
Dept: SLEEP MEDICINE | Facility: OTHER | Age: 39
End: 2024-03-13
Payer: COMMERCIAL

## 2024-03-13 ENCOUNTER — TELEPHONE (OUTPATIENT)
Dept: INTERNAL MEDICINE | Facility: CLINIC | Age: 39
End: 2024-03-13
Payer: COMMERCIAL

## 2024-03-13 NOTE — TELEPHONE ENCOUNTER
----- Message from Marisol Lima sent at 3/13/2024  9:58 AM CDT -----  Contact: Mobile  [phone]  Patient is returning a phone call.  Who left a message for the patient: Chana Hernandez MA  Does patient know what this is regarding:  A message from Chana Hernandez MA  Would you like a call back, or a response through your [facility] portal?:   Call

## 2024-03-13 NOTE — TELEPHONE ENCOUNTER
----- Message from Mariaelena Houser MD sent at 3/12/2024  4:47 PM CDT -----  Please see one-month repeat panel

## 2024-04-17 ENCOUNTER — TELEPHONE (OUTPATIENT)
Dept: SLEEP MEDICINE | Facility: OTHER | Age: 39
End: 2024-04-17
Payer: COMMERCIAL

## 2024-05-15 ENCOUNTER — TELEPHONE (OUTPATIENT)
Dept: SLEEP MEDICINE | Facility: OTHER | Age: 39
End: 2024-05-15
Payer: COMMERCIAL

## 2024-09-24 ENCOUNTER — HOSPITAL ENCOUNTER (EMERGENCY)
Facility: HOSPITAL | Age: 39
Discharge: HOME | End: 2024-09-24
Attending: EMERGENCY MEDICINE
Payer: COMMERCIAL

## 2024-09-24 VITALS
DIASTOLIC BLOOD PRESSURE: 91 MMHG | WEIGHT: 185 LBS | BODY MASS INDEX: 28.04 KG/M2 | HEIGHT: 68 IN | RESPIRATION RATE: 18 BRPM | OXYGEN SATURATION: 97 % | HEART RATE: 80 BPM | SYSTOLIC BLOOD PRESSURE: 164 MMHG | TEMPERATURE: 98 F

## 2024-09-24 DIAGNOSIS — S61.011A LACERATION OF RIGHT THUMB WITHOUT FOREIGN BODY WITHOUT DAMAGE TO NAIL, INITIAL ENCOUNTER: Primary | ICD-10-CM

## 2024-09-24 PROCEDURE — 12001 RPR S/N/AX/GEN/TRNK 2.5CM/<: CPT

## 2024-09-24 PROCEDURE — 99282 EMERGENCY DEPT VISIT SF MDM: CPT | Mod: 25

## 2024-09-25 NOTE — ED PROVIDER NOTES
Encounter Date: 9/24/2024       History     Chief Complaint   Patient presents with    Laceration     Using slicer and cut tip of r thumb, bleeding controlled     This patient is a 38-year-old male Past Medical History:  No date: Basal cell carcinoma  No date: History of ankle surgery  Presenting to the emergency department for a small laceration to his right thumb.  He is right-hand dominant.  Tetanus up-to-date.  Reports he was using a food slicer to prepare dinner that was clean and shave off an oval portion of skin on the radial aspect of the right distal thumb, no nail involvement, superficial.  Wound thoroughly cleaned prior to arrival.  Reports minimal pain.      Review of patient's allergies indicates:  No Known Allergies  Past Medical History:   Diagnosis Date    Basal cell carcinoma     History of ankle surgery      History reviewed. No pertinent surgical history.  Family History   Problem Relation Name Age of Onset    Skin cancer Mother       Social History     Tobacco Use    Smoking status: Never    Smokeless tobacco: Never   Substance Use Topics    Alcohol use: Yes     Comment: Occ     Drug use: Never     Review of Systems   Skin:  Positive for wound.       Physical Exam     Initial Vitals [09/24/24 1703]   BP Pulse Resp Temp SpO2   (!) 179/100 82 20 98.4 °F (36.9 °C) 96 %      MAP       --         Physical Exam    Nursing note and vitals reviewed.  Constitutional: He appears well-nourished. No distress.   Eyes: Conjunctivae and EOM are normal.   Neck:   Normal range of motion.  Pulmonary/Chest: No respiratory distress.   Musculoskeletal:         General: Normal range of motion.      Cervical back: Normal range of motion.     Neurological: He is alert and oriented to person, place, and time.   Skin: Skin is warm and dry.   0.5 cm x 0.25 cm oval-shaped superficial laceration, distal right thumb tip, no retained foreign body, scant bleeding, primarily from the center of the wound, no nail involvement, not  contaminated   Psychiatric: He has a normal mood and affect. Thought content normal.         ED Course   Lac Repair    Date/Time: 9/24/2024 8:36 PM    Performed by: Alonso Escobedo MD  Authorized by: Alonso Escobedo MD    Consent:     Consent obtained:  Verbal    Consent given by:  Patient  Anesthesia:     Anesthesia method:  None  Laceration details:     Location:  Finger    Finger location:  R thumb    Length (cm):  0.5    Depth (mm):  0.2  Exploration:     Hemostasis achieved with:  Direct pressure    Imaging outcome: foreign body not noted      Wound exploration: entire depth of wound visualized      Contaminated: no    Treatment:     Area cleansed with:  Soap and water    Visualized foreign bodies/material removed: no      Debridement:  None  Skin repair:     Repair method:  Tissue adhesive  Repair type:     Repair type:  Simple  Post-procedure details:     Dressing:  Open (no dressing)    Procedure completion:  Tolerated well, no immediate complications    Labs Reviewed - No data to display       Imaging Results    None          Medications - No data to display  Medical Decision Making  Patient emergently assessed.  Vital signs stable.  Wound treated and bleeding stopped with multiple layers of Dermabond.  Patient educated about supportive care.  Very low infection risk.  Asked to follow up with doctor within the week to ensure appropriate healing.  Asked to return to the emergency department immediately for any new, concerning, or worsening symptoms.  Patient agreeable this plan and discharged in stable condition.                                      Clinical Impression:  Final diagnoses:  [S61.011A] Laceration of right thumb without foreign body without damage to nail, initial encounter (Primary)          ED Disposition Condition    Discharge Stable          ED Prescriptions    None       Follow-up Information       Follow up With Specialties Details Why Contact Mariaelena Storey MD Family  Medicine Schedule an appointment as soon as possible for a visit   [address] [facility] [address]  [phone]               Alonso Escobedo MD  09/24/24 2038

## 2024-09-25 NOTE — ED TRIAGE NOTES
Colin Mixon, a 38 y.o. male presents to the ED w/ complaint of laceration to right thumb. Pt has full feeling and movement of finger.     Triage note:  Chief Complaint   Patient presents with    Laceration     Using slicer and cut tip of r thumb, bleeding controlled     Review of patient's allergies indicates:  No Known Allergies  Past Medical History:   Diagnosis Date    Basal cell carcinoma     History of ankle surgery

## 2024-09-25 NOTE — ED NOTES
Provider at bedside for Dermabond   Pt presents to the ED complaining of an intermittent cough for the last 6 months, which is worse during the day. Pt states that her cough is intermittently productive with clear or yellow sputum. Pt states that she occasionally has a metallic taste in her mouth when she wakes up in the morning. On exam, the pt's heart is RRR w/o murmur, lungs are CTAB, abd is benign with NABS and oropharynx is normal w/o post-nasal drip. I agree with the plan to order a CXR prior to disposition.     I supervised care provided by the midlevel provider.    We have discussed this patient's history, physical exam, and treatment plan.   I have reviewed the note and personally saw and examined the patient and agree with the plan of care.    Documentation assistance provided by doni Bush for Dr. Hardy.  Information recorded by the scribe was done at my direction and has been verified and validated by me.       Lety Bush  04/20/17 6032       Gurinder Hardy MD  04/20/17 8223

## 2024-10-02 ENCOUNTER — TELEPHONE (OUTPATIENT)
Dept: INTERNAL MEDICINE | Facility: CLINIC | Age: 39
End: 2024-10-02
Payer: COMMERCIAL

## 2024-10-02 NOTE — TELEPHONE ENCOUNTER
Attempted to call patient to schedule ER follow up appointment. Patient didn't answer, left message to call office back.

## 2024-10-08 ENCOUNTER — OFFICE VISIT (OUTPATIENT)
Dept: INTERNAL MEDICINE | Facility: CLINIC | Age: 39
End: 2024-10-08
Payer: COMMERCIAL

## 2024-10-08 VITALS
HEIGHT: 68 IN | SYSTOLIC BLOOD PRESSURE: 138 MMHG | BODY MASS INDEX: 28.59 KG/M2 | WEIGHT: 188.63 LBS | DIASTOLIC BLOOD PRESSURE: 78 MMHG | OXYGEN SATURATION: 98 % | HEART RATE: 78 BPM | TEMPERATURE: 98 F

## 2024-10-08 DIAGNOSIS — Z51.89 ENCOUNTER FOR WOUND RE-CHECK: Primary | ICD-10-CM

## 2024-10-08 PROCEDURE — 99999 PR PBB SHADOW E&M-EST. PATIENT-LVL III: CPT | Mod: PBBFAC,,,

## 2024-10-08 PROCEDURE — 3078F DIAST BP <80 MM HG: CPT | Mod: CPTII,S$GLB,,

## 2024-10-08 PROCEDURE — 3044F HG A1C LEVEL LT 7.0%: CPT | Mod: CPTII,S$GLB,,

## 2024-10-08 PROCEDURE — 99212 OFFICE O/P EST SF 10 MIN: CPT | Mod: S$GLB,,,

## 2024-10-08 PROCEDURE — 1159F MED LIST DOCD IN RCRD: CPT | Mod: CPTII,S$GLB,,

## 2024-10-08 PROCEDURE — 1160F RVW MEDS BY RX/DR IN RCRD: CPT | Mod: CPTII,S$GLB,,

## 2024-10-08 PROCEDURE — 3008F BODY MASS INDEX DOCD: CPT | Mod: CPTII,S$GLB,,

## 2024-10-08 PROCEDURE — 3075F SYST BP GE 130 - 139MM HG: CPT | Mod: CPTII,S$GLB,,

## 2024-10-08 NOTE — PROGRESS NOTES
"Subjective:       Patient ID: Colin Mixon is a 38 y.o. male.    Chief Complaint: Wound check    History of Present Illness      CHIEF COMPLAINT:  Mr. Mixon presents today for a wound check.    HAND INJURY:  He sustained a hand injury while using a food slicer on the 24th, initially treated with Dermabond in the emergency room. The last of the glue came off yesterday. He denies any drainage, redness, warmth, or loss of sensation in the affected area.    BLOOD PRESSURE:  He reports a history of fluctuating blood pressure, describing it as "all over the place." He does not regularly check his blood pressure at home.    MEDICAL HISTORY:  His tetanus vaccination is up to date.    SOCIAL HISTORY:  He reports experiencing work-related stress, describing his stress levels as "all over the place."      ROS: Denies any new or concerning symptoms.     HPI  Review of Systems      Objective:      Physical Exam  Vitals reviewed.   Constitutional:       General: He is awake. He is not in acute distress.     Appearance: Normal appearance. He is well-developed and well-groomed. He is not ill-appearing, toxic-appearing or diaphoretic.   HENT:      Head: Normocephalic and atraumatic.   Eyes:      Pupils: Pupils are equal, round, and reactive to light.   Cardiovascular:      Rate and Rhythm: Normal rate.      Pulses: Normal pulses.   Pulmonary:      Effort: Pulmonary effort is normal. No respiratory distress.   Musculoskeletal:         General: Normal range of motion.   Skin:     Findings: Lesion present.      Comments: Healing wound to right pointer finger, derma bond remains in place. No evidence of infection.    Neurological:      Mental Status: He is alert.   Psychiatric:         Behavior: Behavior is cooperative.         Assessment:       There are no diagnoses linked to this encounter.    Plan:   Assessment & Plan    HYPERTENSION:  - Explained importance of monitoring blood pressure due to potential health risks " associated with uncontrolled hypertension.  - Mr. Palmer to monitor blood pressure at home if possible.  - Follow up with Dr. Houser regarding blood pressure concerns.    WOUND CARE:  - Mr. Palmer to keep wound clean and dry.  - Mr. Palmer to cover wound when out or at work.  - Mr. Palmer to avoid over-macerating the skin.             Chaparro López,MSN, APRN, FNP-C  [facility], Internal Medicine  8:22 AM      This note was generated with the assistance of ambient listening technology. Verbal consent was obtained by the patient and accompanying visitor(s) for the recording of patient appointment to facilitate this note. I attest to having reviewed and edited the generated note for accuracy, though some syntax or spelling errors may persist. Please contact the author of this note for any clarification.

## 2025-02-08 ENCOUNTER — HOSPITAL ENCOUNTER (EMERGENCY)
Facility: HOSPITAL | Age: 40
Discharge: HOME | End: 2025-02-08
Attending: EMERGENCY MEDICINE
Payer: COMMERCIAL

## 2025-02-08 VITALS
SYSTOLIC BLOOD PRESSURE: 149 MMHG | RESPIRATION RATE: 20 BRPM | BODY MASS INDEX: 28.04 KG/M2 | DIASTOLIC BLOOD PRESSURE: 93 MMHG | TEMPERATURE: 98 F | HEART RATE: 91 BPM | OXYGEN SATURATION: 98 % | WEIGHT: 185 LBS | HEIGHT: 68 IN

## 2025-02-08 DIAGNOSIS — S61.216A LACERATION OF RIGHT LITTLE FINGER WITHOUT FOREIGN BODY WITHOUT DAMAGE TO NAIL, INITIAL ENCOUNTER: Primary | ICD-10-CM

## 2025-02-08 PROCEDURE — 12001 RPR S/N/AX/GEN/TRNK 2.5CM/<: CPT

## 2025-02-08 PROCEDURE — 99282 EMERGENCY DEPT VISIT SF MDM: CPT

## 2025-02-09 NOTE — ED TRIAGE NOTES
Cut the  pad of his rt 5th finger w/  a vegetable slicer PTA. Last tetanus is UTD per pt report. Retains ROM 5tyh finger Bleeding   resolved with pressure dressing that pt  arrived w/

## 2025-02-09 NOTE — ED NOTES
LOC: The patient is awake and alert; oriented x 3 and speaking appropriately.  APPEARANCE: Patient resting comfortably, patient is clean and well groomed  SKIN: warm and dry, normal skin turgor & moist mucus membranes, skin intact, no breakdown noted.Lac to pad of rt 5th finger  MUSCULOSKELETAL: Patient moving all extremities well, no obvious swelling or deformities noted  RESPIRATORY: Airway is open and patent, respirations are spontaneous, normal effort and rate  CARDIAC: Patient has a normal rate, no peripheral edema noted, capillary refill < 3 seconds; No complaints of chest pain   ABDOMEN: Soft and non tender to palpation, no distention noted. Bowel sounds present x 4

## 2025-02-10 NOTE — ED PROVIDER NOTES
Encounter Date: 2/8/2025       History     Chief Complaint   Patient presents with    Laceration     Right pinky finger lac while cutting vegetable. Denies numbness. Bleeding controlled in triage.      This patient is a 39-year-old male presenting to the emergency department with complaints of laceration to the right 5th digit.  He reports he cut the superficial portion of his R 5th pinky finger using a clean vegetable slicer. Bleeding controlled PTA with direct pressure. Tet UTD. No pain meds pta. Denies a change in finger functionality and sensation as a result of injury.       Review of patient's allergies indicates:  No Known Allergies  Past Medical History:   Diagnosis Date    Basal cell carcinoma     History of ankle surgery      Past Surgical History:   Procedure Laterality Date    ANKLE SURGERY      skin cancer surgery       Family History   Problem Relation Name Age of Onset    Skin cancer Mother       Social History     Tobacco Use    Smoking status: Never    Smokeless tobacco: Never   Substance Use Topics    Alcohol use: Yes     Comment: Occ     Drug use: Never     Review of Systems   Skin:  Positive for wound.       Physical Exam     Initial Vitals [02/08/25 1817]   BP Pulse Resp Temp SpO2   (!) 149/93 91 20 97.6 °F (36.4 °C) 98 %      MAP       --         Physical Exam    Nursing note and vitals reviewed.  Constitutional: He appears well-nourished. No distress.   HENT:   Head: Normocephalic and atraumatic.   Eyes: Conjunctivae and EOM are normal.   Neck: Neck supple.   Normal range of motion.  Cardiovascular:  Normal rate.           Pulmonary/Chest: No respiratory distress.   Musculoskeletal:         General: Normal range of motion.      Cervical back: Normal range of motion and neck supple.      Comments: Superficial maceration of R 5th dig finger pad, no nail involvement, clean     Neurological: He is alert and oriented to person, place, and time.   Skin: Skin is warm and dry.   Psychiatric: He has a  normal mood and affect. Thought content normal.         ED Course   Lac Repair    Date/Time: 2/10/2025 12:35 AM    Performed by: Alonso Escobedo MD  Authorized by: Alonso Escobedo MD    Consent:     Consent obtained:  Verbal    Consent given by:  Patient    Risks, benefits, and alternatives were discussed: yes      Risks discussed:  Infection  Anesthesia:     Anesthesia method:  None  Laceration details:     Location:  Finger    Finger location:  R small finger    Length (cm):  0.5    Depth (mm):  1  Exploration:     Hemostasis achieved with:  Direct pressure    Imaging outcome: foreign body not noted      Wound exploration: entire depth of wound visualized      Contaminated: no    Treatment:     Amount of cleaning:  Standard    Visualized foreign bodies/material removed: no    Skin repair:     Repair method:  Tissue adhesive  Approximation:     Approximation:  Close  Repair type:     Repair type:  Simple  Post-procedure details:     Dressing:  Non-adherent dressing    Procedure completion:  Tolerated well, no immediate complications    Labs Reviewed - No data to display       Imaging Results    None          Medications - No data to display  Medical Decision Making  Pt rap assessed. VSS. Injury very superficial, no concern for deeper abnormality. Closed with dermabond and educated about supp care. Asked to f/u with PCP in 3-5 for wound recheck. Asked to return to the ED immed for any new, worsening or concerning symptoms. Pt agreeable and dc'd in stable condition.                                      Clinical Impression:  Final diagnoses:  [S61.216A] Laceration of right little finger without foreign body without damage to nail, initial encounter (Primary)          ED Disposition Condition    Discharge Stable          ED Prescriptions    None       Follow-up Information       Follow up With Specialties Details Why Contact Mariaelena Storey MD Family Medicine Schedule an appointment as soon as possible  for a visit   [address] [facility] [address]  [phone]               Alonso Escobedo MD  02/10/25 0037

## 2025-07-21 ENCOUNTER — HOSPITAL ENCOUNTER (EMERGENCY)
Facility: HOSPITAL | Age: 40
Discharge: HOME | End: 2025-07-22
Attending: EMERGENCY MEDICINE
Payer: COMMERCIAL

## 2025-07-21 DIAGNOSIS — R11.2 NAUSEA AND VOMITING, UNSPECIFIED VOMITING TYPE: ICD-10-CM

## 2025-07-21 DIAGNOSIS — R41.0 CONFUSION: Primary | ICD-10-CM

## 2025-07-21 DIAGNOSIS — F10.920 ALCOHOLIC INTOXICATION WITHOUT COMPLICATION: ICD-10-CM

## 2025-07-21 PROCEDURE — 99284 EMERGENCY DEPT VISIT MOD MDM: CPT

## 2025-07-21 NOTE — Clinical Note
Yana Mixon accompanied their spouse to the emergency department on 7/21/2025. They may return to work on 07/25/2025.      If you have any questions or concerns, please don't hesitate to call.      Beto Palma MD

## 2025-07-21 NOTE — Clinical Note
"Colin Baptiste" Oral was seen and treated in our emergency department on 7/21/2025.  He may return to work on 07/25/2025.       If you have any questions or concerns, please don't hesitate to call.      Beto Palma MD"

## 2025-07-22 VITALS
WEIGHT: 180 LBS | BODY MASS INDEX: 27.28 KG/M2 | RESPIRATION RATE: 15 BRPM | TEMPERATURE: 99 F | OXYGEN SATURATION: 94 % | DIASTOLIC BLOOD PRESSURE: 85 MMHG | HEIGHT: 68 IN | HEART RATE: 73 BPM | SYSTOLIC BLOOD PRESSURE: 144 MMHG

## 2025-07-22 LAB
ABSOLUTE EOSINOPHIL (OHS): 0.05 K/UL
ABSOLUTE MONOCYTE (OHS): 0.42 K/UL (ref 0.3–1)
ABSOLUTE NEUTROPHIL COUNT (OHS): 7.59 K/UL (ref 1.8–7.7)
ALBUMIN SERPL BCP-MCNC: 4.7 G/DL (ref 3.5–5.2)
ALP SERPL-CCNC: 44 UNIT/L (ref 40–150)
ALT SERPL W/O P-5'-P-CCNC: 49 UNIT/L (ref 10–44)
AMMONIA PLAS-SCNC: 42 UMOL/L (ref 10–50)
ANION GAP (OHS): 14 MMOL/L (ref 8–16)
APAP SERPL-MCNC: <3 UG/ML (ref 10–20)
AST SERPL-CCNC: 38 UNIT/L (ref 11–45)
BASOPHILS # BLD AUTO: 0.04 K/UL
BASOPHILS NFR BLD AUTO: 0.4 %
BILIRUB SERPL-MCNC: 0.2 MG/DL (ref 0.1–1)
BIPAP: 0
BUN SERPL-MCNC: 12 MG/DL (ref 6–20)
CALCIUM SERPL-MCNC: 8.8 MG/DL (ref 8.7–10.5)
CHLORIDE SERPL-SCNC: 107 MMOL/L (ref 95–110)
CK SERPL-CCNC: 121 U/L (ref 20–200)
CO2 SERPL-SCNC: 19 MMOL/L (ref 23–29)
CREAT SERPL-MCNC: 0.8 MG/DL (ref 0.5–1.4)
ERYTHROCYTE [DISTWIDTH] IN BLOOD BY AUTOMATED COUNT: 12 % (ref 11.5–14.5)
ETHANOL SERPL-MCNC: 315 MG/DL
GFR SERPLBLD CREATININE-BSD FMLA CKD-EPI: >60 ML/MIN/1.73/M2
GLUCOSE SERPL-MCNC: 118 MG/DL (ref 70–110)
HCT VFR BLD AUTO: 39.9 % (ref 40–54)
HCV AB SERPL QL IA: NORMAL
HGB BLD-MCNC: 13.8 GM/DL (ref 14–18)
HIV 1+2 AB+HIV1 P24 AG SERPL QL IA: NORMAL
IMM GRANULOCYTES # BLD AUTO: 0.06 K/UL (ref 0–0.04)
IMM GRANULOCYTES NFR BLD AUTO: 0.6 % (ref 0–0.5)
LYMPHOCYTES # BLD AUTO: 1.41 K/UL (ref 1–4.8)
MCH RBC QN AUTO: 31.2 PG (ref 27–31)
MCHC RBC AUTO-ENTMCNC: 34.6 G/DL (ref 32–36)
MCV RBC AUTO: 90 FL (ref 82–98)
NUCLEATED RBC (/100WBC) (OHS): 0 /100 WBC
PCO2 BLDA: 38.6 MMHG (ref 35–45)
PH SMN: 7.39 [PH] (ref 7.35–7.45)
PLATELET # BLD AUTO: 244 K/UL (ref 150–450)
PMV BLD AUTO: 10.7 FL (ref 9.2–12.9)
PO2 BLDA: 88.6 MMHG (ref 40–60)
POC BASE DEFICIT: -1.5 MMOL/L
POC HCO3: 23.1 MMOL/L (ref 24–28)
POC PERFORMED BY: ABNORMAL
POTASSIUM SERPL-SCNC: 3.6 MMOL/L (ref 3.5–5.1)
PROT SERPL-MCNC: 7.5 GM/DL (ref 6–8.4)
RBC # BLD AUTO: 4.43 M/UL (ref 4.6–6.2)
RELATIVE EOSINOPHIL (OHS): 0.5 %
RELATIVE LYMPHOCYTE (OHS): 14.7 % (ref 18–48)
RELATIVE MONOCYTE (OHS): 4.4 % (ref 4–15)
RELATIVE NEUTROPHIL (OHS): 79.4 % (ref 38–73)
SALICYLATES SERPL-MCNC: <5 MG/DL (ref 15–30)
SODIUM SERPL-SCNC: 140 MMOL/L (ref 136–145)
SPECIMEN SOURCE: ABNORMAL
WBC # BLD AUTO: 9.57 K/UL (ref 3.9–12.7)

## 2025-07-22 PROCEDURE — 82077 ASSAY SPEC XCP UR&BREATH IA: CPT | Performed by: EMERGENCY MEDICINE

## 2025-07-22 PROCEDURE — 80053 COMPREHEN METABOLIC PANEL: CPT | Performed by: EMERGENCY MEDICINE

## 2025-07-22 PROCEDURE — 85025 COMPLETE CBC W/AUTO DIFF WBC: CPT | Performed by: EMERGENCY MEDICINE

## 2025-07-22 PROCEDURE — 86803 HEPATITIS C AB TEST: CPT | Performed by: PHYSICIAN ASSISTANT

## 2025-07-22 PROCEDURE — 82803 BLOOD GASES ANY COMBINATION: CPT

## 2025-07-22 PROCEDURE — 80179 DRUG ASSAY SALICYLATE: CPT | Performed by: EMERGENCY MEDICINE

## 2025-07-22 PROCEDURE — 82140 ASSAY OF AMMONIA: CPT | Performed by: EMERGENCY MEDICINE

## 2025-07-22 PROCEDURE — 80143 DRUG ASSAY ACETAMINOPHEN: CPT | Performed by: EMERGENCY MEDICINE

## 2025-07-22 PROCEDURE — 87389 HIV-1 AG W/HIV-1&-2 AB AG IA: CPT | Performed by: PHYSICIAN ASSISTANT

## 2025-07-22 PROCEDURE — 82550 ASSAY OF CK (CPK): CPT | Performed by: EMERGENCY MEDICINE

## 2025-07-22 PROCEDURE — 99900035 HC TECH TIME PER 15 MIN (STAT)

## 2025-07-22 NOTE — ED PROVIDER NOTES
Encounter Date: 7/21/2025       History     Chief Complaint   Patient presents with    Altered Mental Status     Patient arrives with family altered 2030 today, patients family member states the patient has not been sleeping, patient began vomiting and began to become altered, confusion and disorientation.      Colin Mixon is a 39 y.o. male who has a past medical history of Basal cell carcinoma and History of ankle surgery.    The patient presents to the ED due to altered mental status, acute confusion and projectile vomiting while at home witnessed by his wife.  Patient's wife states that he soiled himself while in bed while stumbling out onto the floor though did not hit his head or lose consciousness.  Patient then had violent vomiting at which point she was gravely concerned and brought him to the hospital with her father.  Patient was initially worked up for stroke rule out in the waiting room at which time there were no focal findings and we deferred stroke activation.  Patient follows all commands and is A&O x3 but ill-appearing.    The history is provided by the patient, medical records, the spouse and a parent. No  was used.     Review of patient's allergies indicates:  No Known Allergies  Past Medical History:   Diagnosis Date    Basal cell carcinoma     History of ankle surgery      Past Surgical History:   Procedure Laterality Date    ANKLE SURGERY      skin cancer surgery       Family History   Problem Relation Name Age of Onset    Skin cancer Mother       Social History[1]  Review of Systems   All other systems reviewed and are negative.      Physical Exam     Initial Vitals [07/21/25 2235]   BP Pulse Resp Temp SpO2   (!) 144/85 83 20 98.6 °F (37 °C) 95 %      MAP       --         Physical Exam    Nursing note and vitals reviewed.  Constitutional: He appears well-developed and well-nourished. He appears distressed.   Acutely ill/toxic appearing   HENT:   Head:  Normocephalic and atraumatic.   Eyes: Conjunctivae and EOM are normal. Pupils are equal, round, and reactive to light.   Neck: Neck supple. No JVD present.   Normal range of motion.  Cardiovascular:  Normal heart sounds and intact distal pulses.           Pulmonary/Chest: Breath sounds normal. No stridor. No respiratory distress.   Abdominal: Abdomen is soft. He exhibits no distension. There is no abdominal tenderness. There is no guarding.   Musculoskeletal:         General: Normal range of motion.      Cervical back: Normal range of motion and neck supple.     Neurological: He is alert and oriented to person, place, and time. He has normal strength. GCS score is 15. GCS eye subscore is 4. GCS verbal subscore is 5. GCS motor subscore is 6.   Skin: Skin is warm and dry. Capillary refill takes less than 2 seconds.         ED Course   Procedures  Labs Reviewed   COMPREHENSIVE METABOLIC PANEL - Abnormal       Result Value    Sodium 140      Potassium 3.6      Chloride 107      CO2 19 (*)     Glucose 118 (*)     BUN 12      Creatinine 0.8      Calcium 8.8      Protein Total 7.5      Albumin 4.7      Bilirubin Total 0.2      ALP 44      AST 38      ALT 49 (*)     Anion Gap 14      eGFR >60     ALCOHOL,MEDICAL (ETHANOL) - Abnormal    Alcohol, Serum 315 (*)    ACETAMINOPHEN LEVEL - Abnormal    Acetaminophen Level <3.0 (*)    SALICYLATE LEVEL - Abnormal    Salicylate Level <5.0 (*)    CBC WITH DIFFERENTIAL - Abnormal    WBC 9.57      RBC 4.43 (*)     HGB 13.8 (*)     HCT 39.9 (*)     MCV 90      MCH 31.2 (*)     MCHC 34.6      RDW 12.0      Platelet Count 244      MPV 10.7      Nucleated RBC 0      Neut % 79.4 (*)     Lymph % 14.7 (*)     Mono % 4.4      Eos % 0.5      Basophil % 0.4      Imm Grans % 0.6 (*)     Neut # 7.59      Lymph # 1.41      Mono # 0.42      Eos # 0.05      Baso # 0.04      Imm Grans # 0.06 (*)    HEPATITIS C ANTIBODY - Normal    Hep C Ab Interp Non-Reactive     HIV 1 / 2 ANTIBODY - Normal    HIV 1/2  Ag/Ab Non-Reactive     CK - Normal         AMMONIA - Normal    Ammonia 42     HEP C VIRUS HOLD SPECIMEN    Extra Tube Hold for add-ons.     CBC W/ AUTO DIFFERENTIAL    Narrative:     The following orders were created for panel order CBC auto differential.  Procedure                               Abnormality         Status                     ---------                               -----------         ------                     CBC with Differential[[id number]]       Abnormal            Final result                 Please view results for these tests on the individual orders.   EXTRA TUBES    Narrative:     The following orders were created for panel order EXTRA TUBES.  Procedure                               Abnormality         Status                     ---------                               -----------         ------                     Lavender Top Hold[[id number]]                               Final result                 Please view results for these tests on the individual orders.   LAVENDER TOP HOLD    Extra Tube Hold for add-ons.       EKG Readings: (Independently Interpreted)   Initial Reading: No STEMI.       Imaging Results              CT Head Without Contrast (Final result)  Result time 07/22/25 00:39:51      Final result by Art Rivera MD (07/22/25 00:39:51)                   Impression:      There is no evidence for acute intracranial process.      Electronically signed by: Art Rivera  Date:    07/22/2025  Time:    00:39               Narrative:    EXAMINATION:  CT HEAD WITHOUT CONTRAST    CLINICAL HISTORY:  Mental status change, unknown cause;    TECHNIQUE:  Low dose axial images were obtained through the head.  Coronal and sagittal reformations were also performed. Contrast was not administered.    COMPARISON:  None.    FINDINGS:  The ventricular system, sulcal pattern and parenchymal attenuation characteristics appear appropriate for age.  There is no evidence for intracranial  mass, mass effect or midline shift and there is no evidence for acute intracranial hemorrhage.  Appropriate CSF spaces are seen at the skull base.    The mastoid air cells appear well aerated.  The visualized paranasal sinuses appear appropriate.  The visualized aspects of the orbits appear unremarkable.  The osseous structures appear intact.                                    X-Rays:   Independently Interpreted Readings:   Chest X-Ray: Normal heart size.  No infiltrates.  No acute abnormalities.   Head CT: No hemorrhage.  No skull fracture.  No acute stroke.  The calcified pineal gland is midline.     Medications - No data to display  Medical Decision Making  39-year-old male as described above presenting with confusion, profound nausea, vomiting and fecal incontinence.  Remote history of alcohol abuse.  Toxic appearing on exam but no focal neurological deficits.  We will order labs and imaging to further evaluate for the possible diagnoses: gastritis, gastroenteritis, pancreatitis, cholecystitis, ileus, small bowel obstruction, appendicitis, toxidrome, intracranial pathologies.     Update/re-evaluation:  CT head without intracranial findings.  Unchanged neuro exam.  Remainder of labs grossly normal separate from markedly elevated ethanol levels.  Patient endorsed to consuming copious amounts of alcohol suspiciously.  Patient given IV fluids and assessed until clinical sobriety was achieved.  Patient's family was updated on results and has a reliable resource and advocates at home.  Return precautions given, patient discharged home with follow up with primary care provider and resources for sobriety.     Amount and/or Complexity of Data Reviewed  Labs: ordered. Decision-making details documented in ED Course.  Radiology: ordered. Decision-making details documented in ED Course.              Attending Attestation:   Physician Attestation Statement for Resident:  As the supervising MD   Physician Attestation  "Statement: I have personally seen and examined this patient.   I agree with the above history.  -: I was called by the nurse to evaluate this patient in the triage for an acute stroke code.  Patient presented with his wife for acute encephalopathy.  She noted he seemed more tired than usual today and "he seemed like he was going to going to bed early." He went to bed around 830.  She found him awake in bed in stool and confused.  She notes that she has found him confused at times but never this severe.  On exam he is awake but slowed and confused verbal responses.  Strength and sensation is preserved throughout.  He has no pronator drift.  He smells of alcohol.  Stroke code was not activated as he had no lateralizing signs.  High priority patient.  Will obtain CT head and labs.    Reassessment: Labs with significant alcohol intoxication 315.  Otherwise unrevealing.  Patient's mentation has been closer to baseline.  Urine labs are pending but I suspect that his encephalopathy is secondary to alcohol intake.  Apparently he has been hiding his alcohol consumption from her.  Patient and wife provided with resources.   As the supervising MD I agree with the above PE.     As the supervising MD I agree with the above treatment, course, plan, and disposition.                    ED Course as of 07/25/25 1206   Tue Jul 22, 2025   0138 CT Head Without Contrast  No intracranial pathologies appreciated.  Ventricles normal in size.  No midline shift or mass effect.  Calvarium is intact with no fractures. [JL]   0139 CBC auto differential(!)  No leukocytosis moderate left shift.  H&H stable, no anemia. [JL]   0139 Ammonia: 42  Am less concerned for hepatic encephalopathy. [JL]   0156 Alcohol, Serum(!!): 315  Markedly elevated ethanol levels [JL]      ED Course User Index  [JL] Beto Palma MD                               Clinical Impression:  Final diagnoses:  [R41.0] Confusion (Primary)  [F10.920] Alcoholic intoxication " without complication  [R11.2] Nausea and vomiting, unspecified vomiting type          ED Disposition Condition    Discharge Stable          ED Prescriptions    None       Follow-up Information       Follow up With Specialties Details Why Contact Info    Mariaelena Houser MD Family Medicine Schedule an appointment as soon as possible for a visit in 1 week  [address] [facility]  [place] [address]  [phone]      [facility] - Emergency Dept Emergency Medicine Go to  As needed, If symptoms worsen [address] [facility] [id number] [phone]                 Beto Palma MD  Resident  07/22/25 0559         [1]   Social History  Tobacco Use    Smoking status: Never    Smokeless tobacco: Never   Substance Use Topics    Alcohol use: Yes     Comment: Occ     Drug use: Never        Ralph Park MD  07/25/25 5026

## 2025-07-22 NOTE — ED NOTES
Assumed care of the patient. Pt on continuous cardiac monitoring, continuous pulse oximetry, and automatic BP cuff cycling Q15min. Pt in street clothes, side rails up X2, bed low and locked, and call light is placed within reach. Wife at bedside at this time. Pt denies any complaints or needs.

## 2025-07-22 NOTE — DISCHARGE INSTRUCTIONS
Diagnosis:  Acute alcohol intoxication, uncomplicated    Treatments you had today:   Medications   sodium chloride 0.9% bolus 1,000 mL 1,000 mL (has no administration in time range)       Follow-Up Plan:  - Follow-up with primary care doctor within 3 - 5 days  - Additional testing and/or evaluation as directed by your primary doctor    Return to the Emergency Department for symptoms including but not limited to: worsening symptoms, shortness of breath or chest pain, vomiting with inability to hold down fluids, fevers greater than 100.4°F, passing out/fainting/unconsciousness, or other concerning symptoms.

## 2025-07-22 NOTE — ED TRIAGE NOTES
"Pt arrived to the ED with c/o AMS. Pt lethargic upon assessment, AAOx4. Denies chest pain, sob, or abd pain. Endorses feeling fatigued. Pts wife states "We just got back from a trip and he said he was going to bed early and when I walked into the bedroom I found he had pooped himself and was struggling to get up to go to the bathroom to vomit. Denies nausea endorses remembering vomiting. Denies taking anything PTA. Denies fever endorses chills. Denies numbness/tingling. Pts wife states "This happened before when he hadn't slept much but this time is worse." No other complaints at this time.  "

## 2025-07-23 LAB — HOLD SPECIMEN: NORMAL

## 2025-07-25 LAB — HOLD SPECIMEN: NORMAL
